# Patient Record
Sex: FEMALE | Race: OTHER | HISPANIC OR LATINO
[De-identification: names, ages, dates, MRNs, and addresses within clinical notes are randomized per-mention and may not be internally consistent; named-entity substitution may affect disease eponyms.]

---

## 2017-03-22 ENCOUNTER — RECORD ABSTRACTING (OUTPATIENT)
Age: 22
End: 2017-03-22

## 2017-03-22 DIAGNOSIS — B37.9 CANDIDIASIS, UNSPECIFIED: ICD-10-CM

## 2017-03-22 DIAGNOSIS — Z30.40 ENCOUNTER FOR SURVEILLANCE OF CONTRACEPTIVES, UNSPECIFIED: ICD-10-CM

## 2017-03-22 DIAGNOSIS — Z98.890 OTHER SPECIFIED POSTPROCEDURAL STATES: ICD-10-CM

## 2018-05-18 ENCOUNTER — EMERGENCY (EMERGENCY)
Facility: HOSPITAL | Age: 23
LOS: 0 days | Discharge: HOME | End: 2018-05-19
Attending: EMERGENCY MEDICINE | Admitting: EMERGENCY MEDICINE

## 2018-05-18 VITALS
RESPIRATION RATE: 16 BRPM | OXYGEN SATURATION: 100 % | SYSTOLIC BLOOD PRESSURE: 115 MMHG | TEMPERATURE: 97 F | DIASTOLIC BLOOD PRESSURE: 74 MMHG | HEART RATE: 67 BPM

## 2018-05-18 DIAGNOSIS — R11.0 NAUSEA: ICD-10-CM

## 2018-05-18 DIAGNOSIS — N93.9 ABNORMAL UTERINE AND VAGINAL BLEEDING, UNSPECIFIED: ICD-10-CM

## 2018-05-18 DIAGNOSIS — R06.02 SHORTNESS OF BREATH: ICD-10-CM

## 2018-05-18 DIAGNOSIS — M54.6 PAIN IN THORACIC SPINE: ICD-10-CM

## 2018-05-18 LAB
APPEARANCE UR: (no result)
BACTERIA # UR AUTO: (no result) /HPF
BILIRUB UR-MCNC: NEGATIVE — SIGNIFICANT CHANGE UP
COLOR SPEC: YELLOW — SIGNIFICANT CHANGE UP
D DIMER BLD IA.RAPID-MCNC: 75 NG/ML DDU — SIGNIFICANT CHANGE UP (ref 0–230)
DIFF PNL FLD: (no result)
EPI CELLS # UR: (no result) /HPF
GLUCOSE UR QL: NEGATIVE MG/DL — SIGNIFICANT CHANGE UP
KETONES UR-MCNC: NEGATIVE — SIGNIFICANT CHANGE UP
LEUKOCYTE ESTERASE UR-ACNC: (no result)
NITRITE UR-MCNC: NEGATIVE — SIGNIFICANT CHANGE UP
PH UR: 6.5 — SIGNIFICANT CHANGE UP (ref 5–8)
PROT UR-MCNC: NEGATIVE MG/DL — SIGNIFICANT CHANGE UP
RBC CASTS # UR COMP ASSIST: (no result) /HPF
SP GR SPEC: 1.02 — SIGNIFICANT CHANGE UP (ref 1.01–1.03)
UROBILINOGEN FLD QL: 1 MG/DL (ref 0.2–0.2)
WBC UR QL: SIGNIFICANT CHANGE UP /HPF

## 2018-05-18 NOTE — ED PROVIDER NOTE - NS ED ROS FT
Constitutional:  no fevers, no chills, no malaise  Eyes:  No visual changes  ENMT: No neck pain or stiffness, no nasal congestion, no ear pain, no throat pain  Cardiac:  No chest pain  Respiratory:  No cough, + intermittent SOB as per HPI  GI:  No nausea, vomiting, diarrhea or abdominal pain.  :  No dysuria, frequency or burning.  GYN: As per HPI  MS:  No back pain, no joint pain.  Neuro:  No headache, no dizziness, no change in mental status  Skin:  No skin rash

## 2018-05-18 NOTE — ED PROVIDER NOTE - PHYSICAL EXAMINATION
CONSTITUTIONAL: Well-developed; well-nourished; in no acute distress, nontoxic appearing  SKIN: skin exam is warm and dry,  HEAD: Normocephalic; atraumatic.  EYES: PERRL, 3 mm bilateral, no nystagmus, EOM intact; conjunctiva and sclera clear.  ENT: MMM, no nasal congestion  NECK: Supple; non tender.+ full passive ROM in all directions. No JVD  CARD: S1, S2 normal, no murmur  RESP: No wheezes, rales or rhonchi. Good air movement bilaterally, + right upper back ttp  ABD: soft; non-distended; NON TENDER, No Rebound, No guarding  EXT: Normal ROM. No cyanosis or edema. Dp Pulses intact.   NEURO: awake, alert, following commands, oriented, grossly unremarkable. No Focal deficits. GCS 15.   PSYCH: Cooperative, appropriate.

## 2018-05-18 NOTE — ED ADULT NURSE NOTE - OBJECTIVE STATEMENT
pt sts she has been having her period for about 2 weeks now and she is spotting brownish looking blood.

## 2018-05-18 NOTE — ED ADULT TRIAGE NOTE - CHIEF COMPLAINT QUOTE
"I've been having menstrual bleeding for 2 weeks now. It's not red anymore is more brown. I also have pain in my right lower back." Pt reports having implant in tricep that needs to be removed.

## 2018-05-18 NOTE — ED PROVIDER NOTE - PROGRESS NOTE DETAILS
Consulted with GYN who states that they will not come to ED for the removal of her implant. Should f/up in clinic

## 2018-05-18 NOTE — ED PROVIDER NOTE - OBJECTIVE STATEMENT
23 yr with hx of Nexplanon implantation 4 years ago presents with complaints vaginal bleeding for the past 2 weeks. Associated with upper right sided back pain. Endorses nausea w/o vomiting. Also reports intermittent SOB. No CP.    No dysuria, no vaginal d/c otherwise. Stated that she wanted the implant to be removed by [facility] on [address] however the appt would be I 1 month, causing her ED presentation.

## 2018-05-19 VITALS
OXYGEN SATURATION: 100 % | RESPIRATION RATE: 18 BRPM | HEART RATE: 69 BPM | DIASTOLIC BLOOD PRESSURE: 65 MMHG | SYSTOLIC BLOOD PRESSURE: 113 MMHG | TEMPERATURE: 97 F

## 2018-06-18 ENCOUNTER — OUTPATIENT (OUTPATIENT)
Dept: OUTPATIENT SERVICES | Facility: HOSPITAL | Age: 23
LOS: 1 days | Discharge: HOME | End: 2018-06-18

## 2018-06-18 ENCOUNTER — APPOINTMENT (OUTPATIENT)
Dept: OBGYN | Facility: CLINIC | Age: 23
End: 2018-06-18

## 2018-06-18 VITALS — DIASTOLIC BLOOD PRESSURE: 60 MMHG | WEIGHT: 123 LBS | SYSTOLIC BLOOD PRESSURE: 100 MMHG

## 2018-06-18 DIAGNOSIS — Z30.46 ENCOUNTER FOR SURVEILLANCE OF IMPLANTABLE SUBDERMAL CONTRACEPTIVE: ICD-10-CM

## 2018-06-20 LAB
C TRACH RRNA SPEC QL NAA+PROBE: NOT DETECTED
N GONORRHOEA RRNA SPEC QL NAA+PROBE: NOT DETECTED
SOURCE AMPLIFICATION: NORMAL

## 2018-07-02 DIAGNOSIS — Z01.419 ENCOUNTER FOR GYNECOLOGICAL EXAMINATION (GENERAL) (ROUTINE) WITHOUT ABNORMAL FINDINGS: ICD-10-CM

## 2018-07-03 LAB
HIV1+2 AB SPEC QL IA.RAPID: NONREACTIVE
T PALLIDUM AB SER QL IA: NEGATIVE

## 2018-07-05 LAB
HBV SURFACE AG SER QL: NONREACTIVE
HCV RNA SERPL NAA DL=5-ACNC: NOT DETECTED IU/ML
HCV RNA SERPL NAA+PROBE-LOG IU: NOT DETECTED LOGIU/ML

## 2018-08-06 ENCOUNTER — APPOINTMENT (OUTPATIENT)
Dept: OBGYN | Facility: CLINIC | Age: 23
End: 2018-08-06

## 2018-09-17 ENCOUNTER — APPOINTMENT (OUTPATIENT)
Dept: OBGYN | Facility: CLINIC | Age: 23
End: 2018-09-17

## 2018-10-01 ENCOUNTER — APPOINTMENT (OUTPATIENT)
Dept: OBGYN | Facility: CLINIC | Age: 23
End: 2018-10-01

## 2018-10-01 ENCOUNTER — OUTPATIENT (OUTPATIENT)
Dept: OUTPATIENT SERVICES | Facility: HOSPITAL | Age: 23
LOS: 1 days | Discharge: HOME | End: 2018-10-01

## 2018-10-01 ENCOUNTER — RESULT CHARGE (OUTPATIENT)
Age: 23
End: 2018-10-01

## 2018-10-01 VITALS
HEIGHT: 63 IN | SYSTOLIC BLOOD PRESSURE: 100 MMHG | BODY MASS INDEX: 20.73 KG/M2 | WEIGHT: 117 LBS | DIASTOLIC BLOOD PRESSURE: 60 MMHG

## 2018-10-01 DIAGNOSIS — O35.1XX0 MATERNAL CARE FOR (SUSPECTED) CHROMOSOMAL ABNORMALITY IN FETUS, NOT APPLICABLE OR UNSPECIFIED: ICD-10-CM

## 2018-10-01 LAB
BILIRUB UR QL STRIP: NORMAL
CLARITY UR: CLEAR
COLLECTION METHOD: NORMAL
GLUCOSE UR-MCNC: NORMAL
HCG UR QL: NORMAL EU/DL
HGB UR QL STRIP.AUTO: NORMAL
KETONES UR-MCNC: NORMAL
LEUKOCYTE ESTERASE UR QL STRIP: NORMAL
NITRITE UR QL STRIP: NORMAL
PH UR STRIP: NORMAL
PROT UR STRIP-MCNC: NORMAL
SP GR UR STRIP: NORMAL

## 2018-10-01 RX ORDER — METOCLOPRAMIDE 10 MG/1
10 TABLET ORAL
Qty: 40 | Refills: 10 | Status: ACTIVE | COMMUNITY
Start: 2018-10-01 | End: 1900-01-01

## 2018-10-02 DIAGNOSIS — Z34.01 ENCOUNTER FOR SUPERVISION OF NORMAL FIRST PREGNANCY, FIRST TRIMESTER: ICD-10-CM

## 2018-10-04 ENCOUNTER — NON-APPOINTMENT (OUTPATIENT)
Age: 23
End: 2018-10-04

## 2018-10-22 ENCOUNTER — APPOINTMENT (OUTPATIENT)
Dept: ANTEPARTUM | Facility: CLINIC | Age: 23
End: 2018-10-22

## 2018-10-24 LAB
ABO + RH PNL BLD: NORMAL
AR GENE MUT ANL BLD/T: NEGATIVE
BACTERIA UR CULT: NORMAL
BASOPHILS # BLD AUTO: 0.03 K/UL
BASOPHILS NFR BLD AUTO: 0.3 %
BLD GP AB SCN SERPL QL: NORMAL
CFTR MUT TESTED BLD/T: NEGATIVE
EOSINOPHIL # BLD AUTO: 0.1 K/UL
EOSINOPHIL NFR BLD AUTO: 1.1 %
HBV SURFACE AG SER QL: NONREACTIVE
HCT VFR BLD CALC: 33.6 %
HGB A MFR BLD: 97.3 %
HGB A2 MFR BLD: 2.7 %
HGB BLD-MCNC: 11.3 G/DL
HGB FRACT BLD-IMP: NORMAL
HIV1+2 AB SPEC QL IA.RAPID: NONREACTIVE
IMM GRANULOCYTES NFR BLD AUTO: 0.3 %
LEAD BLD-MCNC: <1 UG/DL
LYMPHOCYTES # BLD AUTO: 1.53 K/UL
LYMPHOCYTES NFR BLD AUTO: 17 %
MAN DIFF?: NORMAL
MCHC RBC-ENTMCNC: 28.5 PG
MCHC RBC-ENTMCNC: 33.6 G/DL
MCV RBC AUTO: 84.6 FL
MONOCYTES # BLD AUTO: 0.55 K/UL
MONOCYTES NFR BLD AUTO: 6.1 %
NEUTROPHILS # BLD AUTO: 6.77 K/UL
NEUTROPHILS NFR BLD AUTO: 75.2 %
PLATELET # BLD AUTO: 222 K/UL
RBC # BLD: 3.97 M/UL
RBC # FLD: 12.6 %
RUBV IGG FLD-ACNC: 2.2 INDEX
RUBV IGG SER-IMP: POSITIVE
T PALLIDUM AB SER QL IA: NEGATIVE
VZV AB TITR SER: POSITIVE
VZV IGG SER IF-ACNC: 343.9 INDEX
WBC # FLD AUTO: 9.01 K/UL

## 2018-10-29 ENCOUNTER — APPOINTMENT (OUTPATIENT)
Dept: OBGYN | Facility: CLINIC | Age: 23
End: 2018-10-29

## 2018-11-05 ENCOUNTER — OUTPATIENT (OUTPATIENT)
Dept: OUTPATIENT SERVICES | Facility: HOSPITAL | Age: 23
LOS: 1 days | Discharge: HOME | End: 2018-11-05

## 2018-11-05 ENCOUNTER — APPOINTMENT (OUTPATIENT)
Dept: OBGYN | Facility: CLINIC | Age: 23
End: 2018-11-05

## 2018-11-05 ENCOUNTER — RESULT CHARGE (OUTPATIENT)
Age: 23
End: 2018-11-05

## 2018-11-05 VITALS — SYSTOLIC BLOOD PRESSURE: 95 MMHG | WEIGHT: 114 LBS | DIASTOLIC BLOOD PRESSURE: 55 MMHG

## 2018-11-07 DIAGNOSIS — Z34.03 ENCOUNTER FOR SUPERVISION OF NORMAL FIRST PREGNANCY, THIRD TRIMESTER: ICD-10-CM

## 2018-11-07 LAB
BILIRUB UR QL STRIP: NEGATIVE
CLARITY UR: CLEAR
GLUCOSE UR-MCNC: NEGATIVE
HCG UR QL: NEGATIVE EU/DL
HGB UR QL STRIP.AUTO: NEGATIVE
KETONES UR-MCNC: 80
LEUKOCYTE ESTERASE UR QL STRIP: NEGATIVE
NITRITE UR QL STRIP: NEGATIVE
PH UR STRIP: 6
PROT UR STRIP-MCNC: NEGATIVE
SP GR UR STRIP: 1.01

## 2018-12-03 ENCOUNTER — RESULT CHARGE (OUTPATIENT)
Age: 23
End: 2018-12-03

## 2018-12-03 ENCOUNTER — APPOINTMENT (OUTPATIENT)
Dept: OBGYN | Facility: CLINIC | Age: 23
End: 2018-12-03

## 2018-12-03 ENCOUNTER — OUTPATIENT (OUTPATIENT)
Dept: OUTPATIENT SERVICES | Facility: HOSPITAL | Age: 23
LOS: 1 days | Discharge: HOME | End: 2018-12-03

## 2018-12-03 VITALS
HEIGHT: 63 IN | BODY MASS INDEX: 20.55 KG/M2 | WEIGHT: 116 LBS | DIASTOLIC BLOOD PRESSURE: 70 MMHG | SYSTOLIC BLOOD PRESSURE: 100 MMHG

## 2018-12-04 DIAGNOSIS — Z34.93 ENCOUNTER FOR SUPERVISION OF NORMAL PREGNANCY, UNSPECIFIED, THIRD TRIMESTER: ICD-10-CM

## 2018-12-04 LAB
BILIRUB UR QL STRIP: NORMAL
CLARITY UR: CLEAR
COLLECTION METHOD: NORMAL
GLUCOSE UR-MCNC: NORMAL
HCG UR QL: 0.2 EU/DL
HGB UR QL STRIP.AUTO: NORMAL
KETONES UR-MCNC: NORMAL
LEUKOCYTE ESTERASE UR QL STRIP: NORMAL
NITRITE UR QL STRIP: NORMAL
PH UR STRIP: 6
PROT UR STRIP-MCNC: NORMAL
SP GR UR STRIP: 1.02

## 2018-12-17 ENCOUNTER — OUTPATIENT (OUTPATIENT)
Dept: OUTPATIENT SERVICES | Facility: HOSPITAL | Age: 23
LOS: 1 days | Discharge: HOME | End: 2018-12-17

## 2018-12-17 ENCOUNTER — APPOINTMENT (OUTPATIENT)
Dept: ANTEPARTUM | Facility: CLINIC | Age: 23
End: 2018-12-17

## 2018-12-17 ENCOUNTER — TRANSCRIPTION ENCOUNTER (OUTPATIENT)
Age: 23
End: 2018-12-17

## 2018-12-17 RX ORDER — ETONOGESTREL 68 MG/1
IMPLANT SUBCUTANEOUS
Refills: 0 | Status: DISCONTINUED | COMMUNITY
End: 2018-12-17

## 2018-12-19 DIAGNOSIS — Z3A.20 20 WEEKS GESTATION OF PREGNANCY: ICD-10-CM

## 2018-12-19 DIAGNOSIS — O28.3 ABNORMAL ULTRASONIC FINDING ON ANTENATAL SCREENING OF MOTHER: ICD-10-CM

## 2018-12-19 DIAGNOSIS — O35.8XX1 MATERNAL CARE FOR OTHER (SUSPECTED) FETAL ABNORMALITY AND DAMAGE, FETUS 1: ICD-10-CM

## 2019-01-03 ENCOUNTER — OUTPATIENT (OUTPATIENT)
Dept: OUTPATIENT SERVICES | Facility: HOSPITAL | Age: 24
LOS: 1 days | Discharge: HOME | End: 2019-01-03

## 2019-01-03 ENCOUNTER — APPOINTMENT (OUTPATIENT)
Dept: ANTEPARTUM | Facility: CLINIC | Age: 24
End: 2019-01-03

## 2019-01-04 DIAGNOSIS — Z3A.22 22 WEEKS GESTATION OF PREGNANCY: ICD-10-CM

## 2019-01-04 DIAGNOSIS — Z03.74 ENCOUNTER FOR SUSPECTED PROBLEM WITH FETAL GROWTH RULED OUT: ICD-10-CM

## 2019-01-04 DIAGNOSIS — O28.3 ABNORMAL ULTRASONIC FINDING ON ANTENATAL SCREENING OF MOTHER: ICD-10-CM

## 2019-01-04 DIAGNOSIS — Q33.8 OTHER CONGENITAL MALFORMATIONS OF LUNG: ICD-10-CM

## 2019-01-14 ENCOUNTER — APPOINTMENT (OUTPATIENT)
Dept: OBGYN | Facility: CLINIC | Age: 24
End: 2019-01-14

## 2019-01-14 ENCOUNTER — OUTPATIENT (OUTPATIENT)
Dept: OUTPATIENT SERVICES | Facility: HOSPITAL | Age: 24
LOS: 1 days | Discharge: HOME | End: 2019-01-14

## 2019-01-14 ENCOUNTER — RESULT CHARGE (OUTPATIENT)
Age: 24
End: 2019-01-14

## 2019-01-14 VITALS — WEIGHT: 118 LBS | DIASTOLIC BLOOD PRESSURE: 54 MMHG | SYSTOLIC BLOOD PRESSURE: 96 MMHG

## 2019-01-16 DIAGNOSIS — Z34.02 ENCOUNTER FOR SUPERVISION OF NORMAL FIRST PREGNANCY, SECOND TRIMESTER: ICD-10-CM

## 2019-01-16 LAB
BILIRUB UR QL STRIP: NORMAL
CLARITY UR: CLEAR
COLLECTION METHOD: NORMAL
GLUCOSE UR-MCNC: NORMAL
HCG UR QL: 0.2 EU/DL
HGB UR QL STRIP.AUTO: NORMAL
KETONES UR-MCNC: NORMAL
LEUKOCYTE ESTERASE UR QL STRIP: NORMAL
NITRITE UR QL STRIP: NORMAL
PH UR STRIP: 6
PROT UR STRIP-MCNC: NORMAL
SP GR UR STRIP: 1.01

## 2019-01-17 ENCOUNTER — LABORATORY RESULT (OUTPATIENT)
Age: 24
End: 2019-01-17

## 2019-01-17 ENCOUNTER — APPOINTMENT (OUTPATIENT)
Dept: ANTEPARTUM | Facility: CLINIC | Age: 24
End: 2019-01-17

## 2019-01-18 DIAGNOSIS — Q33.0 CONGENITAL CYSTIC LUNG: ICD-10-CM

## 2019-01-18 DIAGNOSIS — O36.5990 MATERNAL CARE FOR OTHER KNOWN OR SUSPECTED POOR FETAL GROWTH, UNSPECIFIED TRIMESTER, NOT APPLICABLE OR UNSPECIFIED: ICD-10-CM

## 2019-01-23 LAB — GLUCOSE 1H P 50 G GLC PO SERPL-MCNC: 94 MG/DL

## 2019-01-25 ENCOUNTER — OUTPATIENT (OUTPATIENT)
Dept: OUTPATIENT SERVICES | Facility: HOSPITAL | Age: 24
LOS: 1 days | Discharge: HOME | End: 2019-01-25

## 2019-01-25 DIAGNOSIS — O35.8XX0 MATERNAL CARE FOR OTHER (SUSPECTED) FETAL ABNORMALITY AND DAMAGE, NOT APPLICABLE OR UNSPECIFIED: ICD-10-CM

## 2019-01-31 ENCOUNTER — APPOINTMENT (OUTPATIENT)
Dept: ANTEPARTUM | Facility: CLINIC | Age: 24
End: 2019-01-31

## 2019-01-31 ENCOUNTER — OUTPATIENT (OUTPATIENT)
Dept: OUTPATIENT SERVICES | Facility: HOSPITAL | Age: 24
LOS: 1 days | Discharge: HOME | End: 2019-01-31

## 2019-01-31 LAB
CYTOMEGALOVIRUS ABS IGM: <30 AU/ML
HERPES SIMPLEX 1 AND 2 ABS IGM: <0.91 RATIO
RUBV IGM FLD-ACNC: <20 AU/ML
T GONDII AB SER-IMP: NEGATIVE
T GONDII AB SER-IMP: NEGATIVE
T GONDII IGG SER QL: <3 IU/ML
T GONDII IGM SER QL: <3 AU/ML
TOXOPLASMA GONDII ABS IGM: <3 AU/ML

## 2019-02-01 ENCOUNTER — OTHER (OUTPATIENT)
Age: 24
End: 2019-02-01

## 2019-02-03 LAB
CMV IGG SERPL QL: 2.2 U/ML
CMV IGG SERPL-IMP: POSITIVE
CMV IGM SERPL QL: <8 AU/ML
CMV IGM SERPL QL: NEGATIVE
HSV 1+2 IGG SER IA-IMP: NEGATIVE
HSV 1+2 IGG SER IA-IMP: POSITIVE
HSV1 IGG SER QL: 25.4 INDEX
HSV1 IGM SER QL: NORMAL TITER
HSV2 AB FLD-ACNC: NORMAL TITER
HSV2 IGG SER QL: 0.19 INDEX

## 2019-02-09 ENCOUNTER — OUTPATIENT (OUTPATIENT)
Dept: OUTPATIENT SERVICES | Facility: HOSPITAL | Age: 24
LOS: 1 days | Discharge: HOME | End: 2019-02-09

## 2019-02-09 DIAGNOSIS — Q33.0 CONGENITAL CYSTIC LUNG: ICD-10-CM

## 2019-02-09 DIAGNOSIS — Z34.90 ENCOUNTER FOR SUPERVISION OF NORMAL PREGNANCY, UNSPECIFIED, UNSPECIFIED TRIMESTER: ICD-10-CM

## 2019-02-11 ENCOUNTER — OUTPATIENT (OUTPATIENT)
Dept: OUTPATIENT SERVICES | Facility: HOSPITAL | Age: 24
LOS: 1 days | Discharge: HOME | End: 2019-02-11

## 2019-02-11 ENCOUNTER — RESULT CHARGE (OUTPATIENT)
Age: 24
End: 2019-02-11

## 2019-02-11 ENCOUNTER — APPOINTMENT (OUTPATIENT)
Dept: OBGYN | Facility: CLINIC | Age: 24
End: 2019-02-11

## 2019-02-11 ENCOUNTER — NON-APPOINTMENT (OUTPATIENT)
Age: 24
End: 2019-02-11

## 2019-02-11 VITALS
DIASTOLIC BLOOD PRESSURE: 72 MMHG | SYSTOLIC BLOOD PRESSURE: 112 MMHG | HEIGHT: 63 IN | BODY MASS INDEX: 22.32 KG/M2 | WEIGHT: 126 LBS

## 2019-02-12 DIAGNOSIS — Z34.93 ENCOUNTER FOR SUPERVISION OF NORMAL PREGNANCY, UNSPECIFIED, THIRD TRIMESTER: ICD-10-CM

## 2019-02-14 ENCOUNTER — APPOINTMENT (OUTPATIENT)
Dept: ANTEPARTUM | Facility: CLINIC | Age: 24
End: 2019-02-14

## 2019-02-14 ENCOUNTER — OUTPATIENT (OUTPATIENT)
Dept: OUTPATIENT SERVICES | Facility: HOSPITAL | Age: 24
LOS: 1 days | Discharge: HOME | End: 2019-02-14

## 2019-02-14 LAB
BILIRUB UR QL STRIP: NORMAL
CLARITY UR: CLEAR
COLLECTION METHOD: NORMAL
GLUCOSE UR-MCNC: NORMAL
HCG UR QL: 0.2 EU/DL
HGB UR QL STRIP.AUTO: NORMAL
KETONES UR-MCNC: NORMAL
LEUKOCYTE ESTERASE UR QL STRIP: NORMAL
NITRITE UR QL STRIP: NORMAL
PH UR STRIP: 5
PROT UR STRIP-MCNC: NORMAL
SP GR UR STRIP: 1.03

## 2019-02-25 ENCOUNTER — APPOINTMENT (OUTPATIENT)
Dept: OBGYN | Facility: CLINIC | Age: 24
End: 2019-02-25

## 2019-02-28 ENCOUNTER — OUTPATIENT (OUTPATIENT)
Dept: OUTPATIENT SERVICES | Facility: HOSPITAL | Age: 24
LOS: 1 days | Discharge: HOME | End: 2019-02-28

## 2019-02-28 ENCOUNTER — APPOINTMENT (OUTPATIENT)
Dept: ANTEPARTUM | Facility: CLINIC | Age: 24
End: 2019-02-28

## 2019-02-28 LAB — CMV IGG AVIDITY SERPL IA-RTO: 1

## 2019-03-14 ENCOUNTER — APPOINTMENT (OUTPATIENT)
Dept: ANTEPARTUM | Facility: CLINIC | Age: 24
End: 2019-03-14

## 2019-03-14 ENCOUNTER — OUTPATIENT (OUTPATIENT)
Dept: OUTPATIENT SERVICES | Facility: HOSPITAL | Age: 24
LOS: 1 days | Discharge: HOME | End: 2019-03-14

## 2019-03-17 LAB
CLARI ADDITIONAL INFO: NORMAL
CLARI CHROMOSOME 13: NORMAL
CLARI CHROMOSOME 18: NORMAL
CLARI CHROMOSOME 21: NORMAL
CLARI SEX CHROMOSOMES: NORMAL
CLARITEST NIPT: NORMAL

## 2019-03-18 ENCOUNTER — APPOINTMENT (OUTPATIENT)
Dept: OBGYN | Facility: CLINIC | Age: 24
End: 2019-03-18

## 2019-03-18 ENCOUNTER — NON-APPOINTMENT (OUTPATIENT)
Age: 24
End: 2019-03-18

## 2019-03-18 ENCOUNTER — RESULT CHARGE (OUTPATIENT)
Age: 24
End: 2019-03-18

## 2019-03-18 ENCOUNTER — OUTPATIENT (OUTPATIENT)
Dept: OUTPATIENT SERVICES | Facility: HOSPITAL | Age: 24
LOS: 1 days | Discharge: HOME | End: 2019-03-18

## 2019-03-18 VITALS
BODY MASS INDEX: 23.21 KG/M2 | WEIGHT: 131 LBS | DIASTOLIC BLOOD PRESSURE: 60 MMHG | SYSTOLIC BLOOD PRESSURE: 96 MMHG | HEIGHT: 63 IN

## 2019-03-18 DIAGNOSIS — O36.5990 MATERNAL CARE FOR OTHER KNOWN OR SUSPECTED POOR FETAL GROWTH, UNSPECIFIED TRIMESTER, NOT APPLICABLE OR UNSPECIFIED: ICD-10-CM

## 2019-03-18 DIAGNOSIS — O28.3 ABNORMAL ULTRASONIC FINDING ON ANTENATAL SCREENING OF MOTHER: ICD-10-CM

## 2019-03-19 DIAGNOSIS — Z34.03 ENCOUNTER FOR SUPERVISION OF NORMAL FIRST PREGNANCY, THIRD TRIMESTER: ICD-10-CM

## 2019-03-19 LAB
BILIRUB UR QL STRIP: NORMAL
CLARITY UR: NORMAL
COLLECTION METHOD: NORMAL
GLUCOSE UR-MCNC: NORMAL
HCG UR QL: 1 EU/DL
HGB UR QL STRIP.AUTO: NORMAL
KETONES UR-MCNC: NORMAL
LEUKOCYTE ESTERASE UR QL STRIP: NORMAL
NITRITE UR QL STRIP: NORMAL
PH UR STRIP: 7
PROT UR STRIP-MCNC: NORMAL
SP GR UR STRIP: 1.01

## 2019-03-28 ENCOUNTER — APPOINTMENT (OUTPATIENT)
Dept: ANTEPARTUM | Facility: CLINIC | Age: 24
End: 2019-03-28

## 2019-03-29 ENCOUNTER — TRANSCRIPTION ENCOUNTER (OUTPATIENT)
Age: 24
End: 2019-03-29

## 2019-03-29 DIAGNOSIS — O28.3 ABNORMAL ULTRASONIC FINDING ON ANTENATAL SCREENING OF MOTHER: ICD-10-CM

## 2019-03-29 DIAGNOSIS — Q33.0 CONGENITAL CYSTIC LUNG: ICD-10-CM

## 2019-03-29 DIAGNOSIS — O36.5990 MATERNAL CARE FOR OTHER KNOWN OR SUSPECTED POOR FETAL GROWTH, UNSPECIFIED TRIMESTER, NOT APPLICABLE OR UNSPECIFIED: ICD-10-CM

## 2019-04-01 ENCOUNTER — APPOINTMENT (OUTPATIENT)
Dept: OBGYN | Facility: CLINIC | Age: 24
End: 2019-04-01

## 2019-04-01 ENCOUNTER — OUTPATIENT (OUTPATIENT)
Dept: OUTPATIENT SERVICES | Facility: HOSPITAL | Age: 24
LOS: 1 days | End: 2019-04-01
Payer: MEDICAID

## 2019-04-01 ENCOUNTER — RESULT CHARGE (OUTPATIENT)
Age: 24
End: 2019-04-01

## 2019-04-01 ENCOUNTER — OUTPATIENT (OUTPATIENT)
Dept: OUTPATIENT SERVICES | Facility: HOSPITAL | Age: 24
LOS: 1 days | Discharge: HOME | End: 2019-04-01

## 2019-04-01 VITALS
SYSTOLIC BLOOD PRESSURE: 88 MMHG | DIASTOLIC BLOOD PRESSURE: 58 MMHG | WEIGHT: 133 LBS | HEIGHT: 63 IN | BODY MASS INDEX: 23.57 KG/M2

## 2019-04-01 DIAGNOSIS — Z34.90 ENCOUNTER FOR SUPERVISION OF NORMAL PREGNANCY, UNSPECIFIED, UNSPECIFIED TRIMESTER: ICD-10-CM

## 2019-04-01 PROCEDURE — G9001: CPT

## 2019-04-02 DIAGNOSIS — Z34.03 ENCOUNTER FOR SUPERVISION OF NORMAL FIRST PREGNANCY, THIRD TRIMESTER: ICD-10-CM

## 2019-04-02 LAB
BILIRUB UR QL STRIP: NORMAL
C TRACH RRNA SPEC QL NAA+PROBE: NOT DETECTED
CLARITY UR: CLEAR
COLLECTION METHOD: NORMAL
GLUCOSE UR-MCNC: NORMAL
HCG UR QL: 0.2 EU/DL
HGB UR QL STRIP.AUTO: NORMAL
KETONES UR-MCNC: NORMAL
LEUKOCYTE ESTERASE UR QL STRIP: NORMAL
N GONORRHOEA RRNA SPEC QL NAA+PROBE: NOT DETECTED
NITRITE UR QL STRIP: NORMAL
PH UR STRIP: 7.5
PROT UR STRIP-MCNC: NORMAL
SOURCE AMPLIFICATION: NORMAL
SP GR UR STRIP: 1.02

## 2019-04-05 LAB
GP B STREP DNA SPEC QL NAA+PROBE: NORMAL
GP B STREP DNA SPEC QL NAA+PROBE: NOT DETECTED
SOURCE GBS: NORMAL

## 2019-04-08 ENCOUNTER — OUTPATIENT (OUTPATIENT)
Dept: OUTPATIENT SERVICES | Facility: HOSPITAL | Age: 24
LOS: 1 days | Discharge: HOME | End: 2019-04-08

## 2019-04-08 ENCOUNTER — APPOINTMENT (OUTPATIENT)
Dept: OBGYN | Facility: CLINIC | Age: 24
End: 2019-04-08

## 2019-04-08 ENCOUNTER — RESULT CHARGE (OUTPATIENT)
Age: 24
End: 2019-04-08

## 2019-04-08 VITALS
SYSTOLIC BLOOD PRESSURE: 100 MMHG | BODY MASS INDEX: 23.93 KG/M2 | DIASTOLIC BLOOD PRESSURE: 60 MMHG | WEIGHT: 135.06 LBS | HEIGHT: 63 IN

## 2019-04-09 DIAGNOSIS — Z34.03 ENCOUNTER FOR SUPERVISION OF NORMAL FIRST PREGNANCY, THIRD TRIMESTER: ICD-10-CM

## 2019-04-10 LAB
BILIRUB UR QL STRIP: NORMAL
CLARITY UR: CLEAR
COLLECTION METHOD: NORMAL
GLUCOSE UR-MCNC: NORMAL
HCG UR QL: 0.2 EU/DL
HGB UR QL STRIP.AUTO: NORMAL
KETONES UR-MCNC: NORMAL
LEUKOCYTE ESTERASE UR QL STRIP: NORMAL
NITRITE UR QL STRIP: NORMAL
PH UR STRIP: 6
PROT UR STRIP-MCNC: NORMAL
SP GR UR STRIP: 1.03

## 2019-04-15 ENCOUNTER — OUTPATIENT (OUTPATIENT)
Dept: OUTPATIENT SERVICES | Facility: HOSPITAL | Age: 24
LOS: 1 days | Discharge: HOME | End: 2019-04-15

## 2019-04-15 ENCOUNTER — APPOINTMENT (OUTPATIENT)
Dept: OBGYN | Facility: CLINIC | Age: 24
End: 2019-04-15

## 2019-04-15 VITALS
WEIGHT: 136 LBS | BODY MASS INDEX: 24.1 KG/M2 | DIASTOLIC BLOOD PRESSURE: 62 MMHG | HEIGHT: 63 IN | SYSTOLIC BLOOD PRESSURE: 98 MMHG

## 2019-04-18 ENCOUNTER — OUTPATIENT (OUTPATIENT)
Dept: OUTPATIENT SERVICES | Facility: HOSPITAL | Age: 24
LOS: 1 days | Discharge: HOME | End: 2019-04-18

## 2019-04-18 ENCOUNTER — APPOINTMENT (OUTPATIENT)
Dept: ANTEPARTUM | Facility: CLINIC | Age: 24
End: 2019-04-18

## 2019-04-18 ENCOUNTER — INPATIENT (INPATIENT)
Facility: HOSPITAL | Age: 24
LOS: 2 days | Discharge: HOME | End: 2019-04-21
Attending: OBSTETRICS & GYNECOLOGY | Admitting: OBSTETRICS & GYNECOLOGY

## 2019-04-18 VITALS — TEMPERATURE: 99 F

## 2019-04-18 DIAGNOSIS — O28.3 ABNORMAL ULTRASONIC FINDING ON ANTENATAL SCREENING OF MOTHER: ICD-10-CM

## 2019-04-18 DIAGNOSIS — Z34.03 ENCOUNTER FOR SUPERVISION OF NORMAL FIRST PREGNANCY, THIRD TRIMESTER: ICD-10-CM

## 2019-04-18 DIAGNOSIS — Z3A.37 37 WEEKS GESTATION OF PREGNANCY: ICD-10-CM

## 2019-04-18 DIAGNOSIS — Z98.890 OTHER SPECIFIED POSTPROCEDURAL STATES: Chronic | ICD-10-CM

## 2019-04-18 DIAGNOSIS — O36.5931 MATERNAL CARE FOR OTHER KNOWN OR SUSPECTED POOR FETAL GROWTH, THIRD TRIMESTER, FETUS 1: ICD-10-CM

## 2019-04-18 LAB
APPEARANCE UR: SIGNIFICANT CHANGE UP
BACTERIA # UR AUTO: ABNORMAL /HPF
BASOPHILS # BLD AUTO: 0.02 K/UL — SIGNIFICANT CHANGE UP (ref 0–0.2)
BASOPHILS NFR BLD AUTO: 0.2 % — SIGNIFICANT CHANGE UP (ref 0–1)
BILIRUB UR-MCNC: NEGATIVE — SIGNIFICANT CHANGE UP
BLD GP AB SCN SERPL QL: SIGNIFICANT CHANGE UP
COLOR SPEC: YELLOW — SIGNIFICANT CHANGE UP
DIFF PNL FLD: NEGATIVE — SIGNIFICANT CHANGE UP
EOSINOPHIL # BLD AUTO: 0.05 K/UL — SIGNIFICANT CHANGE UP (ref 0–0.7)
EOSINOPHIL NFR BLD AUTO: 0.5 % — SIGNIFICANT CHANGE UP (ref 0–8)
EPI CELLS # UR: ABNORMAL /HPF
GLUCOSE UR QL: NEGATIVE — SIGNIFICANT CHANGE UP
HCT VFR BLD CALC: 34.6 % — LOW (ref 37–47)
HGB BLD-MCNC: 11.7 G/DL — LOW (ref 12–16)
HIV 1 & 2 AB SERPL IA.RAPID: SIGNIFICANT CHANGE UP
IMM GRANULOCYTES NFR BLD AUTO: 0.6 % — HIGH (ref 0.1–0.3)
KETONES UR-MCNC: NEGATIVE — SIGNIFICANT CHANGE UP
LEUKOCYTE ESTERASE UR-ACNC: ABNORMAL
LYMPHOCYTES # BLD AUTO: 1.71 K/UL — SIGNIFICANT CHANGE UP (ref 1.2–3.4)
LYMPHOCYTES # BLD AUTO: 15.8 % — LOW (ref 20.5–51.1)
MCHC RBC-ENTMCNC: 28.8 PG — SIGNIFICANT CHANGE UP (ref 27–31)
MCHC RBC-ENTMCNC: 33.8 G/DL — SIGNIFICANT CHANGE UP (ref 32–37)
MCV RBC AUTO: 85.2 FL — SIGNIFICANT CHANGE UP (ref 81–99)
MONOCYTES # BLD AUTO: 0.59 K/UL — SIGNIFICANT CHANGE UP (ref 0.1–0.6)
MONOCYTES NFR BLD AUTO: 5.5 % — SIGNIFICANT CHANGE UP (ref 1.7–9.3)
NEUTROPHILS # BLD AUTO: 8.39 K/UL — HIGH (ref 1.4–6.5)
NEUTROPHILS NFR BLD AUTO: 77.4 % — HIGH (ref 42.2–75.2)
NITRITE UR-MCNC: NEGATIVE — SIGNIFICANT CHANGE UP
NRBC # BLD: 0 /100 WBCS — SIGNIFICANT CHANGE UP (ref 0–0)
PH UR: 7.5 — SIGNIFICANT CHANGE UP (ref 5–8)
PLATELET # BLD AUTO: 235 K/UL — SIGNIFICANT CHANGE UP (ref 130–400)
PRENATAL SYPHILIS TEST: SIGNIFICANT CHANGE UP
PROT UR-MCNC: NEGATIVE — SIGNIFICANT CHANGE UP
RBC # BLD: 4.06 M/UL — LOW (ref 4.2–5.4)
RBC # FLD: 13.5 % — SIGNIFICANT CHANGE UP (ref 11.5–14.5)
SP GR SPEC: 1.01 — SIGNIFICANT CHANGE UP (ref 1.01–1.03)
TYPE + AB SCN PNL BLD: SIGNIFICANT CHANGE UP
UROBILINOGEN FLD QL: 1 (ref 0.2–0.2)
WBC # BLD: 10.82 K/UL — HIGH (ref 4.8–10.8)
WBC # FLD AUTO: 10.82 K/UL — HIGH (ref 4.8–10.8)
WBC UR QL: ABNORMAL /HPF

## 2019-04-18 RX ORDER — INFLUENZA VIRUS VACCINE 15; 15; 15; 15 UG/.5ML; UG/.5ML; UG/.5ML; UG/.5ML
0.5 SUSPENSION INTRAMUSCULAR ONCE
Qty: 0 | Refills: 0 | Status: DISCONTINUED | OUTPATIENT
Start: 2019-04-18 | End: 2019-04-21

## 2019-04-18 RX ORDER — SODIUM CHLORIDE 9 MG/ML
1000 INJECTION, SOLUTION INTRAVENOUS
Qty: 0 | Refills: 0 | Status: DISCONTINUED | OUTPATIENT
Start: 2019-04-18 | End: 2019-04-19

## 2019-04-18 RX ORDER — SODIUM CHLORIDE 9 MG/ML
1000 INJECTION, SOLUTION INTRAVENOUS ONCE
Qty: 0 | Refills: 0 | Status: DISCONTINUED | OUTPATIENT
Start: 2019-04-18 | End: 2019-04-19

## 2019-04-18 RX ORDER — OXYTOCIN 10 UNIT/ML
125 VIAL (ML) INJECTION
Qty: 20 | Refills: 0 | Status: DISCONTINUED | OUTPATIENT
Start: 2019-04-18 | End: 2019-04-19

## 2019-04-18 RX ORDER — DINOPROSTONE 10 MG/241MG
10 INSERT VAGINAL ONCE
Qty: 0 | Refills: 0 | Status: DISCONTINUED | OUTPATIENT
Start: 2019-04-18 | End: 2019-04-21

## 2019-04-18 NOTE — OB PROVIDER H&P - HISTORY OF PRESENT ILLNESS
24yo  at 24yo  at 37w4d GA, by LMP c/w 1st trim sono, sent in for IOL for IUGR<10% and abnormal dopplers (elevated S/D ratio) on ultrasound today. Denies ctx, VB/LOF. Good FM. Fetal CPAM found on ultrasound, ECHO done normal. 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 CVM IgG+, IgM neg. CMV avidity 1 (high) indicating likely past infection>6mo ago. Denies other complications during this pregnancy.

## 2019-04-18 NOTE — OB PROVIDER H&P - NS_OBGYNHISTORY_OBGYN_ALL_OB_FT
OBHx: primigravid     GynHx: h/o HSV1 pos serology; denies h/o outbreaks. H/o ovarian cyst conservatively managed. Denies h/o fibroids, abnormal pap smears, other STIs.

## 2019-04-18 NOTE — OB PROVIDER H&P - ASSESSMENT
22yo  at 37w4d GA, GBS neg, fetal CPAM on ultrasound with normal fetal ECHO, CMV IgG pos, IgM neg with high avidity, for IOL with cervidil for IUGR with abnormal dopplers   - admit to L&D  - admission labs  - continuous EFM/toco  - clear liquids  - pain mgmt prn   - IV hydration  - cervidil     Dr. Estrada and Dr. Otero aware.

## 2019-04-18 NOTE — OB PROVIDER H&P - NSHPLABSRESULTS_GEN_ALL_CORE
ega 37.4w- efw 2523g (<10%); cephalic, ant placenta, MVP 4.9cm, BPP 8/8, elevated umbilical artery doppler S/D ratio 3.6  ega 34.4w- efw 2203g (30%), cephalic, ant placenta, MVP 6.9cm, BPP 8/8, right sided lung mass measuring 1.5x1.2x1.3 mm (CPAM most likely)  ega 32.4w- efw 1749g (31%), cephalic, ant placenta, MVP 6cm, lungs (CPAM 1.5x1.2x1.1mm)   ega 30.4w- SIUP  ega 28.4w- efw 1200g (47%) cephalic, ant placenta, MVP 6.4cm; CPAM+ 1.9x2.1x1.1cm  ega 26.4w- efw 947g (46%), transverse, ant placenta, MVP 5.9cm, CPAM+   ega 24.4w- efw 684g (43%), breech, ant placenta, MVP 6.2cm; echogenic bowel, low lying placenta   ega 22.4w- efw 521g, cephalic, ant placenta, MVP 4.7cm CPAM+  ega 20.1w- efw 351g, breech, 3vc, ant placenta, MVP 5.2cm; normal anatomy; CPAM+ low lying placenta  ega 12.1w- NT WNL; SIUP  FETAL ECHO- NORMAL     GCT 94     HSV1 IgG serology+  HSV1/2 IgM neg   CMV IgG+; CMV IgM neg   NIPT normal

## 2019-04-18 NOTE — OB PROVIDER H&P - NSHPPHYSICALEXAM_GEN_ALL_CORE
Vital Signs Last 24 Hrs  T(C): 37 (18 Apr 2019 21:38), Max: 37 (18 Apr 2019 21:21)  T(F): 98.6 (18 Apr 2019 21:38), Max: 98.6 (18 Apr 2019 21:21)  HR: 85 (18 Apr 2019 21:38) (85 - 85)  BP: 104/44 (18 Apr 2019 21:38) (104/44 - 104/44)  RR: 18 (18 Apr 2019 21:38) (18 - 18)    EFM: 135/mod/accels+  [place]: irreg ctx  Abd: soft, gravid, nontender, occ palpable ctx  SVE: C/L/P, vtx by sono, intact Vital Signs Last 24 Hrs  T(C): 37 (18 Apr 2019 21:38), Max: 37 (18 Apr 2019 21:21)  T(F): 98.6 (18 Apr 2019 21:38), Max: 98.6 (18 Apr 2019 21:21)  HR: 85 (18 Apr 2019 21:38) (85 - 85)  BP: 104/44 (18 Apr 2019 21:38) (104/44 - 104/44)  RR: 18 (18 Apr 2019 21:38) (18 - 18)    EFM: 135/mod/accels+  [place]: irreg ctx  Abd: soft, gravid, nontender, occ palpable ctx  SVE: C/L/P, vtx by sono, intact   Speculum: no lesions

## 2019-04-19 DIAGNOSIS — Z02.9 ENCOUNTER FOR ADMINISTRATIVE EXAMINATIONS, UNSPECIFIED: ICD-10-CM

## 2019-04-19 LAB
AMPHET UR-MCNC: NEGATIVE — SIGNIFICANT CHANGE UP
BARBITURATES UR SCN-MCNC: NEGATIVE — SIGNIFICANT CHANGE UP
BENZODIAZ UR-MCNC: NEGATIVE — SIGNIFICANT CHANGE UP
BUPRENORPHINE SCREEN, URINE RESULT: NEGATIVE — SIGNIFICANT CHANGE UP
COCAINE METAB.OTHER UR-MCNC: NEGATIVE — SIGNIFICANT CHANGE UP
L&D DRUG SCREEN, URINE: SIGNIFICANT CHANGE UP
METHADONE UR-MCNC: NEGATIVE — SIGNIFICANT CHANGE UP
OPIATES UR-MCNC: NEGATIVE — SIGNIFICANT CHANGE UP
OXYCODONE UR-MCNC: NEGATIVE — SIGNIFICANT CHANGE UP
PCP UR-MCNC: NEGATIVE — SIGNIFICANT CHANGE UP
PROPOXYPHENE QUALITATIVE URINE RESULT: NEGATIVE — SIGNIFICANT CHANGE UP

## 2019-04-19 RX ORDER — BENZOCAINE 10 %
1 GEL (GRAM) MUCOUS MEMBRANE EVERY 6 HOURS
Qty: 0 | Refills: 0 | Status: DISCONTINUED | OUTPATIENT
Start: 2019-04-19 | End: 2019-04-21

## 2019-04-19 RX ORDER — SODIUM CHLORIDE 9 MG/ML
3 INJECTION INTRAMUSCULAR; INTRAVENOUS; SUBCUTANEOUS EVERY 8 HOURS
Qty: 0 | Refills: 0 | Status: DISCONTINUED | OUTPATIENT
Start: 2019-04-19 | End: 2019-04-21

## 2019-04-19 RX ORDER — DIPHENHYDRAMINE HCL 50 MG
50 CAPSULE ORAL ONCE
Qty: 0 | Refills: 0 | Status: COMPLETED | OUTPATIENT
Start: 2019-04-19 | End: 2019-04-19

## 2019-04-19 RX ORDER — DOCUSATE SODIUM 100 MG
100 CAPSULE ORAL
Qty: 0 | Refills: 0 | Status: DISCONTINUED | OUTPATIENT
Start: 2019-04-19 | End: 2019-04-21

## 2019-04-19 RX ORDER — LANOLIN
1 OINTMENT (GRAM) TOPICAL EVERY 6 HOURS
Qty: 0 | Refills: 0 | Status: DISCONTINUED | OUTPATIENT
Start: 2019-04-19 | End: 2019-04-21

## 2019-04-19 RX ORDER — DIPHENHYDRAMINE HCL 50 MG
25 CAPSULE ORAL EVERY 6 HOURS
Qty: 0 | Refills: 0 | Status: DISCONTINUED | OUTPATIENT
Start: 2019-04-19 | End: 2019-04-21

## 2019-04-19 RX ORDER — ACETAMINOPHEN 500 MG
650 TABLET ORAL EVERY 6 HOURS
Qty: 0 | Refills: 0 | Status: DISCONTINUED | OUTPATIENT
Start: 2019-04-19 | End: 2019-04-21

## 2019-04-19 RX ORDER — ONDANSETRON 8 MG/1
4 TABLET, FILM COATED ORAL EVERY 6 HOURS
Qty: 0 | Refills: 0 | Status: DISCONTINUED | OUTPATIENT
Start: 2019-04-19 | End: 2019-04-21

## 2019-04-19 RX ORDER — GLYCERIN ADULT
1 SUPPOSITORY, RECTAL RECTAL AT BEDTIME
Qty: 0 | Refills: 0 | Status: DISCONTINUED | OUTPATIENT
Start: 2019-04-19 | End: 2019-04-21

## 2019-04-19 RX ORDER — IBUPROFEN 200 MG
600 TABLET ORAL EVERY 6 HOURS
Qty: 0 | Refills: 0 | Status: DISCONTINUED | OUTPATIENT
Start: 2019-04-19 | End: 2019-04-21

## 2019-04-19 RX ORDER — DIBUCAINE 1 %
1 OINTMENT (GRAM) RECTAL EVERY 4 HOURS
Qty: 0 | Refills: 0 | Status: DISCONTINUED | OUTPATIENT
Start: 2019-04-19 | End: 2019-04-21

## 2019-04-19 RX ORDER — MAGNESIUM HYDROXIDE 400 MG/1
30 TABLET, CHEWABLE ORAL
Qty: 0 | Refills: 0 | Status: DISCONTINUED | OUTPATIENT
Start: 2019-04-19 | End: 2019-04-21

## 2019-04-19 RX ORDER — AER TRAVELER 0.5 G/1
1 SOLUTION RECTAL; TOPICAL EVERY 4 HOURS
Qty: 0 | Refills: 0 | Status: DISCONTINUED | OUTPATIENT
Start: 2019-04-19 | End: 2019-04-21

## 2019-04-19 RX ORDER — OXYCODONE AND ACETAMINOPHEN 5; 325 MG/1; MG/1
1 TABLET ORAL
Qty: 0 | Refills: 0 | Status: DISCONTINUED | OUTPATIENT
Start: 2019-04-19 | End: 2019-04-21

## 2019-04-19 RX ORDER — SIMETHICONE 80 MG/1
80 TABLET, CHEWABLE ORAL EVERY 6 HOURS
Qty: 0 | Refills: 0 | Status: DISCONTINUED | OUTPATIENT
Start: 2019-04-19 | End: 2019-04-21

## 2019-04-19 RX ORDER — BUTORPHANOL TARTRATE 2 MG/ML
2 INJECTION, SOLUTION INTRAMUSCULAR; INTRAVENOUS ONCE
Qty: 0 | Refills: 0 | Status: DISCONTINUED | OUTPATIENT
Start: 2019-04-19 | End: 2019-04-19

## 2019-04-19 RX ORDER — OXYTOCIN 10 UNIT/ML
2 VIAL (ML) INJECTION
Qty: 30 | Refills: 0 | Status: DISCONTINUED | OUTPATIENT
Start: 2019-04-19 | End: 2019-04-21

## 2019-04-19 RX ORDER — OXYTOCIN 10 UNIT/ML
41.67 VIAL (ML) INJECTION
Qty: 20 | Refills: 0 | Status: DISCONTINUED | OUTPATIENT
Start: 2019-04-19 | End: 2019-04-21

## 2019-04-19 RX ORDER — NALOXONE HYDROCHLORIDE 4 MG/.1ML
0.1 SPRAY NASAL
Qty: 0 | Refills: 0 | Status: DISCONTINUED | OUTPATIENT
Start: 2019-04-19 | End: 2019-04-21

## 2019-04-19 RX ADMIN — BUTORPHANOL TARTRATE 2 MILLIGRAM(S): 2 INJECTION, SOLUTION INTRAMUSCULAR; INTRAVENOUS at 03:28

## 2019-04-19 RX ADMIN — Medication 2 MILLIUNIT(S)/MIN: at 14:18

## 2019-04-19 RX ADMIN — Medication 50 MILLIGRAM(S): at 03:30

## 2019-04-19 NOTE — PROGRESS NOTE ADULT - ASSESSMENT
22yo  at 37w5d GA, GBS neg, fetal CPAM on ultrasound with normal fetal ECHO, CMV IgG pos, IgM neg with high avidity, for IOL for IUGR and abnormal dopplers S/P cervidil   - rest, will place cervical ripening balloon in 30min-1 hour  - continuous EFM/toco  - clear liquids  - pain mgmt prn   - IV hydration  - f/u UDS     Dr. Otero and Dr. Estrada aware.

## 2019-04-19 NOTE — OB PROVIDER DELIVERY SUMMARY - NSPROVIDERDELIVERYNOTE_OBGYN_ALL_OB_FT
Patient was fully dilated and pushing. Head delivered OA, restituted to BERNDAINE, Anterior shoulder delivered, followed by the posterior shoulder, rest of the body without complications. Baby suctioned, and placed on mother's abdomen. cord clamped and cut. Cord segment and blood obtained. Placenta delivered, intact. Fundus massaged and firm, with good hemostasis. Pitocin given postpartum. Vaginal vault, perineum, and cervix inspected, and first degree perineal laceration noted and repaired with 2-0 chromic in usual fashion. Live infant delivered. APGARs 9/9.     Dr. Otero and Dr. bob present

## 2019-04-19 NOTE — PROGRESS NOTE ADULT - SUBJECTIVE AND OBJECTIVE BOX
PGY4 Note    Patient seen at bedside. No complaints at the moment.    T(F): 98.96 ( @ 16:32), Max: 98.96 ( @ 16:32)  HR: 77 ( @ 17:18)  BP: 107/60 ( @ 17:18) (88/51 - 120/79)  RR: 18 ( @ 21:38)  EFM: 140/mod/early+variable deccels  TOCO: q2-3min  SVE: deferred  last exam at 1512 5/80/-2   AROM at 1230    Medications:  butorphanol Injectable: 2 ( @ 03:28)  diphenhydrAMINE   Injectable: 50 ( @ 03:30)  oxytocin Infusion: 2 ( @ 13:58)      Labs:                        11.7   10.82 )-----------( 235      ( 2019 21:40 )             34.6           Prenatal Syphilis Test: Nonreact ( @ 21:40)  Rapid HIV-1/2 Antibody: Nonreact ( @ 21:40)  Type + Screen: O POS (19 @ 21:41)  Antibody Screen: NEG (19 @ 21:41)    Urinalysis Basic - ( 2019 21:40 )    Color: Yellow / Appearance: SL CLOUDY / S.010 / pH: x  Gluc: x / Ketone: Negative  / Bili: Negative / Urobili: 1.0   Blood: x / Protein: Negative / Nitrite: Negative   Leuk Esterase: Moderate / RBC: x / WBC 26-50 /HPF   Sq Epi: x / Non Sq Epi: Few /HPF / Bacteria: Few /HPF        A/P:   23y  at 37w5d GA IUGR with abnormal dopplers, IOL s/p cervidil and balloon, now on pitocin  -Continue current management   -Pain management prn  -Continuous EFM/toco  -Reevaluate     Dr Donavan ang[place].

## 2019-04-19 NOTE — CHART NOTE - NSCHARTNOTEFT_GEN_A_CORE
Friday 04/19/2019  01:33 AM    Patient being admitted for Cervidil Induction for IUGR.  Patient's chart, notes and labs reviewed.                        11.7   10.82 )-----------( 235      ( 18 Apr 2019 21:40 )             34.6   Admission lab results acceptable for consideration of Neuraxial Nerve Block for Labor Analgesia/Anesthesia.  Will remain immediately available. 2019  01:33 AM    Patient being admitted for Cervidil Induction for IUGR.  37 +4,     Patient's chart, notes and labs reviewed.                        11.7   10.82 )-----------( 235      ( 2019 21:40 )             34.6   Admission lab results acceptable for consideration of Neuraxial Nerve Block for Labor Analgesia/Anesthesia.  Will remain immediately available.

## 2019-04-19 NOTE — PROGRESS NOTE ADULT - SUBJECTIVE AND OBJECTIVE BOX
OBGYN PGY2 Note:     Patient seen and examined at bedside. Comfortable, denies complaints. Cervidil removed at 0101 in view of recurrent late decels; despite positional change; IV fluid bolus and oxygen by face mask. SVE 1/50/-3      T(C): 37 (19 @ 21:38), Max: 37 (19 @ 21:21)  HR: 90 (19 @ 01:05) (85 - 90)  BP: 120/79 (19 @ 01:05) (104/44 - 120/79)  RR: 18 (19 @ 21:38) (18 - )    EFM: 125/mod/accels+   [place]: q1-3min  SVE: 1/50/-3    Meds: dinoprostone Insert 10 milliGRAM(s) Vaginal once  influenza   Vaccine 0.5 milliLiter(s) IntraMuscular once  lactated ringers Bolus 1000 milliLiter(s) IV Bolus once  lactated ringers. 1000 milliLiter(s) IV Continuous <Continuous>  oxytocin Infusion 125 milliUNIT(s)/Min IV Continuous <Continuous>  S/P cervidil       Labs:  O pos                       11.7   10.82 )-----------( 235      ( 2019 21:40 )             34.6         Urinalysis Basic - ( 2019 21:40 )    Color: Yellow / Appearance: SL CLOUDY / S.010 / pH: x  Gluc: x / Ketone: Negative  / Bili: Negative / Urobili: 1.0   Blood: x / Protein: Negative / Nitrite: Negative   Leuk Esterase: Moderate / RBC: x / WBC 26-50 /HPF   Sq Epi: x / Non Sq Epi: Few /HPF / Bacteria: Few /HPF         Prenatal Syphilis Test: Nonreact (19 @ 21:40)  Rapid HIV-1/2 Antibody: Nonreact (19 @ 21:40)

## 2019-04-19 NOTE — PROCEDURE NOTE - NSLOADDOSE_OBGYN_ALL_OB
no Continuous Bupivicaine 0.1 percent/Fentanyl 400mcg added to infusion; 2mc/ml; Fentanyl 400mcg added to 200ml Bupivacaine 0.1% for JANICE infusion (2mcg/ml)

## 2019-04-20 LAB
BASOPHILS # BLD AUTO: 0.02 K/UL — SIGNIFICANT CHANGE UP (ref 0–0.2)
BASOPHILS NFR BLD AUTO: 0.1 % — SIGNIFICANT CHANGE UP (ref 0–1)
EOSINOPHIL # BLD AUTO: 0.04 K/UL — SIGNIFICANT CHANGE UP (ref 0–0.7)
EOSINOPHIL NFR BLD AUTO: 0.3 % — SIGNIFICANT CHANGE UP (ref 0–8)
HCT VFR BLD CALC: 33.1 % — LOW (ref 37–47)
HGB BLD-MCNC: 10.9 G/DL — LOW (ref 12–16)
IMM GRANULOCYTES NFR BLD AUTO: 0.4 % — HIGH (ref 0.1–0.3)
LYMPHOCYTES # BLD AUTO: 1.39 K/UL — SIGNIFICANT CHANGE UP (ref 1.2–3.4)
LYMPHOCYTES # BLD AUTO: 10.2 % — LOW (ref 20.5–51.1)
MCHC RBC-ENTMCNC: 28.6 PG — SIGNIFICANT CHANGE UP (ref 27–31)
MCHC RBC-ENTMCNC: 32.9 G/DL — SIGNIFICANT CHANGE UP (ref 32–37)
MCV RBC AUTO: 86.9 FL — SIGNIFICANT CHANGE UP (ref 81–99)
MONOCYTES # BLD AUTO: 0.84 K/UL — HIGH (ref 0.1–0.6)
MONOCYTES NFR BLD AUTO: 6.2 % — SIGNIFICANT CHANGE UP (ref 1.7–9.3)
NEUTROPHILS # BLD AUTO: 11.3 K/UL — HIGH (ref 1.4–6.5)
NEUTROPHILS NFR BLD AUTO: 82.8 % — HIGH (ref 42.2–75.2)
NRBC # BLD: 0 /100 WBCS — SIGNIFICANT CHANGE UP (ref 0–0)
PLATELET # BLD AUTO: 202 K/UL — SIGNIFICANT CHANGE UP (ref 130–400)
RBC # BLD: 3.81 M/UL — LOW (ref 4.2–5.4)
RBC # FLD: 13.9 % — SIGNIFICANT CHANGE UP (ref 11.5–14.5)
WBC # BLD: 13.65 K/UL — HIGH (ref 4.8–10.8)
WBC # FLD AUTO: 13.65 K/UL — HIGH (ref 4.8–10.8)

## 2019-04-20 RX ADMIN — SODIUM CHLORIDE 3 MILLILITER(S): 9 INJECTION INTRAMUSCULAR; INTRAVENOUS; SUBCUTANEOUS at 12:44

## 2019-04-20 RX ADMIN — Medication 1 TABLET(S): at 12:41

## 2019-04-20 NOTE — PROGRESS NOTE ADULT - ASSESSMENT
22 yo now , s/p , PPD1, doing well.    - pain management PRN  - c/w regular diet, PO hydration  - monitor vitals, bleeding  - encourage ambulation  - anticipate d/c tomorrow    Dr. Ahumada aware and Dr. Truong to be made aware. 22 yo now , s/p , PPD1, doing well.    - pain management PRN  - c/w regular diet, PO hydration  - monitor vitals, bleeding  - encourage ambulation  - f/up 1100 CBC  - anticipate d/c tomorrow    Dr. Ahumada aware and Dr. Truong to be made aware.

## 2019-04-20 NOTE — PROGRESS NOTE ADULT - SUBJECTIVE AND OBJECTIVE BOX
PGY 1 Post Partum note    Subjective: Pt seen and examined at bedside. Doing well, pain controlled. Pt is voiding without difficulty, passing flatus no BM, tolerating regular diet, ambulating, breast feeding. Denies CP, SOB, N/V, LE pain.    Physical exam:    Vital Signs Last 24 Hrs  T(F): 98.5 (20 Apr 2019 07:15), Max: 99 (19 Apr 2019 21:12)  HR: 96 (20 Apr 2019 07:15) (63 - 126)  BP: 98/61 (20 Apr 2019 07:15) (88/51 - 115/68)  RR: 18 (20 Apr 2019 07:15) (18 - 20)    Gen: NAD  CVS: s1s2, rrr  Lungs: ctab, no r/r/w  Abdomen: Soft, appropriately tender, no distension , firm uterine fundus below umbilicus, +BS  Pelvic: Normal lochia rubra noted  Ext: No calf tenderness    Diet: Regular  meds:         LABS:                        11.7   10.82 )-----------( 235      ( 18 Apr 2019 21:40 )             34.6

## 2019-04-21 ENCOUNTER — TRANSCRIPTION ENCOUNTER (OUTPATIENT)
Age: 24
End: 2019-04-21

## 2019-04-21 VITALS
TEMPERATURE: 97 F | DIASTOLIC BLOOD PRESSURE: 55 MMHG | RESPIRATION RATE: 19 BRPM | HEART RATE: 79 BPM | SYSTOLIC BLOOD PRESSURE: 93 MMHG

## 2019-04-21 RX ORDER — SIMETHICONE 80 MG/1
1 TABLET, CHEWABLE ORAL
Qty: 0 | Refills: 0 | COMMUNITY
Start: 2019-04-21

## 2019-04-21 RX ORDER — IBUPROFEN 200 MG
1 TABLET ORAL
Qty: 0 | Refills: 0 | COMMUNITY
Start: 2019-04-21

## 2019-04-21 RX ORDER — DOCUSATE SODIUM 100 MG
1 CAPSULE ORAL
Qty: 0 | Refills: 0 | COMMUNITY
Start: 2019-04-21

## 2019-04-21 RX ADMIN — Medication 1 TABLET(S): at 11:15

## 2019-04-21 RX ADMIN — Medication 600 MILLIGRAM(S): at 00:18

## 2019-04-21 RX ADMIN — Medication 600 MILLIGRAM(S): at 00:45

## 2019-04-21 NOTE — PROGRESS NOTE ADULT - ASSESSMENT
24yo P1, S/P  PPD#2 , recovering well   - RH positive, Rubella immune   - encourage ambulation  - PO hydration  - Continue diet as tolerated   - Monitor vitals and bleeding    Dr. Cooley aware, Dr. Go aware

## 2019-04-21 NOTE — PROGRESS NOTE ADULT - SUBJECTIVE AND OBJECTIVE BOX
HEBER SOFIA  23y  Female    PGY1 Note:  Patient seen and examined bedside. No overnight events. Denies heavy vaginal bleeding and reports pain well controlled. Ambulating without difficulty. Tolerating Diet. Reports +Flatus, +BM.       MEDICATIONS  (STANDING):        MEDICATIONS  (PRN):  acetaminophen   Tablet .. 650 milliGRAM(s) Oral every 6 hours PRN Temp greater or equal to 38.5C (101.3F), Mild Pain (1 - 3)  benzocaine 20%/menthol 0.5% Spray 1 Spray(s) Topical every 6 hours PRN Perineal discomfort  dibucaine 1% Ointment 1 Application(s) Topical every 4 hours PRN Perineal Discomfort  diphenhydrAMINE 25 milliGRAM(s) Oral every 6 hours PRN Itching  docusate sodium 100 milliGRAM(s) Oral two times a day PRN Stool Softening  glycerin Suppository - Adult 1 Suppository(s) Rectal at bedtime PRN Constipation  ibuprofen  Tablet. 600 milliGRAM(s) Oral every 6 hours PRN Moderate Pain (4 - 6)  lanolin Ointment 1 Application(s) Topical every 6 hours PRN Sore Nipples  magnesium hydroxide Suspension 30 milliLiter(s) Oral two times a day PRN Constipation  naloxone Injectable 0.1 milliGRAM(s) IV Push every 3 minutes PRN For ANY of the following changes in patient status:  A. RR LESS THAN 10 breaths per minute, B. Oxygen saturation LESS THAN 90%, C. Sedation score of 6  ondansetron Injectable 4 milliGRAM(s) IV Push every 6 hours PRN Nausea  oxyCODONE    5 mG/acetaminophen 325 mG 1 Tablet(s) Oral every 3 hours PRN Moderate Pain (4 - 6)  simethicone 80 milliGRAM(s) Chew every 6 hours PRN Gas  witch hazel Pads 1 Application(s) Topical every 4 hours PRN Perineal Discomfort      PAST MEDICAL & SURGICAL HISTORY:  No pertinent past medical history  Status post right breast lumpectomy      Physical Exam  Vital Signs Last 24 Hrs  T(C): 35.9 (21 Apr 2019 09:03), Max: 36.7 (20 Apr 2019 17:30)  T(F): 96.7 (21 Apr 2019 09:03), Max: 98 (20 Apr 2019 17:30)  HR: 79 (21 Apr 2019 09:03) (74 - 80)  BP: 93/55 (21 Apr 2019 09:03) (93/55 - 106/67)  BP(mean): 80 (20 Apr 2019 17:30) (80 - 80)  RR: 19 (21 Apr 2019 09:03) (18 - 19)    Gen: AAOx3, NAD  Ext: No calf tenderness, no swelling  Fundus: firm, below umbilicus. Nontender.   Abd: Soft, nontender, nondistended, BS+  Lochia: Minimal, rubria.       Labs:                        10.9   13.65 )-----------( 202      ( 20 Apr 2019 12:00 )             33.1                         11.7   10.82 )-----------( 235      ( 18 Apr 2019 21:40 )             34.6

## 2019-04-21 NOTE — DISCHARGE NOTE OB - CARE PROVIDER_API CALL
Faith Munguia)  Obstetrics and Gynecology  [address] [place] [id number]  Phone: [phone]  Fax: [phone]  Follow Up Time:

## 2019-04-21 NOTE — DISCHARGE NOTE OB - PATIENT PORTAL LINK FT
You can access the [facility] Patient Portal, offered by [facility], by registering with the following website: [email or web address]

## 2019-04-21 NOTE — DISCHARGE NOTE OB - HOSPITAL COURSE
DATE OF DISCHARGE: 19 @ 09:55    HISTORY OF PRESENT ILLNESS/HOSPITAL COURSE: HPI:  24yo  at 37w4d GA, by LMP c/w 1st trim sono, sent in for IOL for IUGR<10% and abnormal dopplers (elevated S/D ratio) on ultrasound today. Denies ctx, VB/LOF. Good FM. Fetal CPAM found on ultrasound, ECHO done normal. 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 CVM IgG+, IgM neg. CMV avidity 1 (high) indicating likely past infection>6mo ago. Denies other complications during this pregnancy. (2019 22:01)    PAST MEDICAL & SURGICAL HISTORY:  No pertinent past medical history  Status post right breast lumpectomy      PROCEDURES PERFORMED: Term spontaneous vaginal delivery  COMPLICATIONS:  -----   POST PARTUM COURSE: uncomplicated, discharged home on PPD2  FINAL DIAGNOSIS:  Status post normal spontaneous vaginal delivery at Gestational Age    DISCHARGE CBC:                       10.9   13.65 )-----------( 202      ( 2019 12:00 )             33.1     DISCHARGE INSTRUCTIONS:  If you have severe abdominal pain, heavy vaginal bleeding, shortness of breath or chest pain please call your doctor or come to the emergency room.   DIET:  Advance as tolerated.  ACTIVITY:  Advance as tolerated.  Pelvic rest for 6 weeks.  Nothing to be inserted into the vagina for 6 weeks, i.e. no tampons, douching, sexual relations, or tub baths.   FOLLOW UP: Make an appointment to see your doctor as instructed   PRESCRIPTIONS: Prescriptions:

## 2019-04-21 NOTE — DISCHARGE NOTE NURSING/CASE MANAGEMENT/SOCIAL WORK - NSDCDPATPORTLINK_GEN_ALL_CORE
You can access the [facility] Patient Portal, offered by [facility], by registering with the following website: [email or web address]

## 2019-04-21 NOTE — DISCHARGE NOTE OB - MEDICATION SUMMARY - MEDICATIONS TO TAKE
I will START or STAY ON the medications listed below when I get home from the hospital:    ibuprofen 600 mg oral tablet  -- 1 tab(s) by mouth every 6 hours, As needed, Moderate Pain (4 - 6)  -- Indication: For pain    docusate sodium 100 mg oral capsule  -- 1 cap(s) by mouth 2 times a day, As needed, Stool Softening  -- Indication: For constipation    simethicone 80 mg oral tablet, chewable  -- 1 tab(s) by mouth every 6 hours, As needed, Gas  -- Indication: For gas pain

## 2019-04-22 ENCOUNTER — APPOINTMENT (OUTPATIENT)
Dept: OBGYN | Facility: CLINIC | Age: 24
End: 2019-04-22

## 2019-04-22 DIAGNOSIS — Z71.89 OTHER SPECIFIED COUNSELING: ICD-10-CM

## 2019-04-25 DIAGNOSIS — O36.5930 MATERNAL CARE FOR OTHER KNOWN OR SUSPECTED POOR FETAL GROWTH, THIRD TRIMESTER, NOT APPLICABLE OR UNSPECIFIED: ICD-10-CM

## 2019-04-25 DIAGNOSIS — Z22.8 CARRIER OF OTHER INFECTIOUS DISEASES: ICD-10-CM

## 2019-04-25 DIAGNOSIS — Z28.21 IMMUNIZATION NOT CARRIED OUT BECAUSE OF PATIENT REFUSAL: ICD-10-CM

## 2019-04-25 DIAGNOSIS — O35.3XX0 MATERNAL CARE FOR (SUSPECTED) DAMAGE TO FETUS FROM VIRAL DISEASE IN MOTHER, NOT APPLICABLE OR UNSPECIFIED: ICD-10-CM

## 2019-04-25 DIAGNOSIS — Z3A.37 37 WEEKS GESTATION OF PREGNANCY: ICD-10-CM

## 2019-04-29 ENCOUNTER — APPOINTMENT (OUTPATIENT)
Dept: OBGYN | Facility: CLINIC | Age: 24
End: 2019-04-29

## 2019-06-10 ENCOUNTER — RESULT CHARGE (OUTPATIENT)
Age: 24
End: 2019-06-10

## 2019-06-10 ENCOUNTER — OUTPATIENT (OUTPATIENT)
Dept: OUTPATIENT SERVICES | Facility: HOSPITAL | Age: 24
LOS: 1 days | Discharge: HOME | End: 2019-06-10

## 2019-06-10 ENCOUNTER — APPOINTMENT (OUTPATIENT)
Dept: OBGYN | Facility: CLINIC | Age: 24
End: 2019-06-10
Payer: MEDICAID

## 2019-06-10 VITALS
WEIGHT: 122 LBS | HEIGHT: 63 IN | BODY MASS INDEX: 21.62 KG/M2 | SYSTOLIC BLOOD PRESSURE: 110 MMHG | DIASTOLIC BLOOD PRESSURE: 72 MMHG

## 2019-06-10 DIAGNOSIS — Z30.46 ENCOUNTER FOR SURVEILLANCE OF IMPLANTABLE SUBDERMAL CONTRACEPTIVE: ICD-10-CM

## 2019-06-10 DIAGNOSIS — O35.8XX0 MATERNAL CARE FOR OTHER (SUSPECTED) FETAL ABNORMALITY AND DAMAGE, NOT APPLICABLE OR UNSPECIFIED: ICD-10-CM

## 2019-06-10 DIAGNOSIS — Q33.0 CONGENITAL CYSTIC LUNG: ICD-10-CM

## 2019-06-10 DIAGNOSIS — Z98.890 OTHER SPECIFIED POSTPROCEDURAL STATES: Chronic | ICD-10-CM

## 2019-06-10 NOTE — HISTORY OF PRESENT ILLNESS
[Postpartum Follow Up] : postpartum follow up [Complications:___] : no complications [] : delivered by vaginal delivery [Breastfeeding] : not currently nursing [Back to Normal] : is back to normal in size [Doing Well] : is doing well [Normal] : the vagina was normal [None] : no vaginal bleeding [de-identified] : no depression, wantrs patch-has estrogen increases risk of dvt/pe/mi./stroke [de-identified] : xulane, f/drea 6 months [No Sign of Infection] : is showing no signs of infection

## 2019-06-12 LAB
HCG UR QL: NEGATIVE
QUALITY CONTROL: YES

## 2019-08-15 ENCOUNTER — MESSAGE (OUTPATIENT)
Age: 24
End: 2019-08-15

## 2019-09-16 ENCOUNTER — APPOINTMENT (OUTPATIENT)
Dept: OBGYN | Facility: CLINIC | Age: 24
End: 2019-09-16
Payer: MEDICAID

## 2019-09-16 ENCOUNTER — OUTPATIENT (OUTPATIENT)
Dept: OUTPATIENT SERVICES | Facility: HOSPITAL | Age: 24
LOS: 1 days | Discharge: HOME | End: 2019-09-16

## 2019-09-16 DIAGNOSIS — Z98.890 OTHER SPECIFIED POSTPROCEDURAL STATES: Chronic | ICD-10-CM

## 2019-09-16 PROCEDURE — 99212 OFFICE O/P EST SF 10 MIN: CPT

## 2019-09-16 NOTE — PHYSICAL EXAM
[Normal] : uterus [No Bleeding] : there was no active vaginal bleeding [Uterine Adnexae] : were not tender and not enlarged [Nl Sphincter Tone] : normal sphincter tone [Internal Hemorrhoid] : an internal hemorrhoid [External Hemorrhoid] : no external hemorrhoids

## 2019-09-18 DIAGNOSIS — K64.8 OTHER HEMORRHOIDS: ICD-10-CM

## 2020-11-16 ENCOUNTER — APPOINTMENT (OUTPATIENT)
Dept: OBGYN | Facility: CLINIC | Age: 25
End: 2020-11-16

## 2020-12-02 ENCOUNTER — OUTPATIENT (OUTPATIENT)
Dept: OUTPATIENT SERVICES | Facility: HOSPITAL | Age: 25
LOS: 1 days | Discharge: HOME | End: 2020-12-02

## 2020-12-02 ENCOUNTER — APPOINTMENT (OUTPATIENT)
Dept: OBGYN | Facility: CLINIC | Age: 25
End: 2020-12-02
Payer: MEDICAID

## 2020-12-02 VITALS — SYSTOLIC BLOOD PRESSURE: 90 MMHG | DIASTOLIC BLOOD PRESSURE: 60 MMHG | WEIGHT: 124 LBS

## 2020-12-02 DIAGNOSIS — Z98.890 OTHER SPECIFIED POSTPROCEDURAL STATES: Chronic | ICD-10-CM

## 2020-12-02 DIAGNOSIS — K64.8 OTHER HEMORRHOIDS: ICD-10-CM

## 2020-12-02 PROCEDURE — 99395 PREV VISIT EST AGE 18-39: CPT

## 2020-12-02 PROCEDURE — 99212 OFFICE O/P EST SF 10 MIN: CPT | Mod: 25

## 2020-12-02 RX ORDER — NORETHINDRONE ACETATE AND ETHINYL ESTRADIOL AND FERROUS FUMARATE 1MG-20(21)
1-20 KIT ORAL DAILY
Qty: 28 | Refills: 12 | Status: ACTIVE | COMMUNITY
Start: 2020-12-02 | End: 1900-01-01

## 2020-12-02 RX ORDER — METRONIDAZOLE 500 MG/1
500 TABLET ORAL TWICE DAILY
Qty: 14 | Refills: 1 | Status: ACTIVE | COMMUNITY
Start: 2020-12-02 | End: 1900-01-01

## 2020-12-02 NOTE — DISCUSSION/SUMMARY
[FreeTextEntry1] : #1 health maintenance done\par \par #2 bv\par hx taken\par exam done\par aptima done\par metro sent\par

## 2020-12-02 NOTE — PHYSICAL EXAM
[Appropriately responsive] : appropriately responsive [Alert] : alert [No Acute Distress] : no acute distress [No Lymphadenopathy] : no lymphadenopathy [Regular Rate Rhythm] : regular rate rhythm [No Murmurs] : no murmurs [Clear to Auscultation B/L] : clear to auscultation bilaterally [Soft] : soft [Non-tender] : non-tender [Non-distended] : non-distended [No HSM] : No HSM [No Lesions] : no lesions [No Mass] : no mass [Oriented x3] : oriented x3 [Examination Of The Breasts] : a normal appearance [No Masses] : no breast masses were palpable [Labia Majora] : normal [Labia Minora] : normal [Discharge] : a  ~M vaginal discharge was present [Scant] : scant [White] : white [Thin] : thin [Normal] : normal [Uterine Adnexae] : normal

## 2020-12-02 NOTE — HISTORY OF PRESENT ILLNESS
[FreeTextEntry1] : 26 y/o female for annual. Unhappy with patch-having skin reactions. Discussed options-will try ocps\par pt also c/o discharge, 2 weeks, scant odor, no itch. [Patient reported PAP Smear was normal] : Patient reported PAP Smear was normal [PapSmeardate] : 2018

## 2020-12-08 LAB
A VAGINAE DNA VAG QL NAA+PROBE: NORMAL
BVAB2 DNA VAG QL NAA+PROBE: NORMAL
C KRUSEI DNA VAG QL NAA+PROBE: NEGATIVE
C TRACH RRNA SPEC QL NAA+PROBE: NEGATIVE
MEGA1 DNA VAG QL NAA+PROBE: ABNORMAL
N GONORRHOEA RRNA SPEC QL NAA+PROBE: NEGATIVE
T VAGINALIS RRNA SPEC QL NAA+PROBE: NEGATIVE

## 2021-01-20 ENCOUNTER — APPOINTMENT (OUTPATIENT)
Dept: OBGYN | Facility: CLINIC | Age: 26
End: 2021-01-20

## 2021-07-23 RX ORDER — NORETHINDRONE ACETATE AND ETHINYL ESTRADIOL AND FERROUS FUMARATE 1MG-20(21)
1-20 KIT ORAL DAILY
Qty: 28 | Refills: 1 | Status: ACTIVE | COMMUNITY
Start: 2021-07-23 | End: 1900-01-01

## 2021-09-22 ENCOUNTER — APPOINTMENT (OUTPATIENT)
Dept: OBGYN | Facility: CLINIC | Age: 26
End: 2021-09-22

## 2021-09-27 NOTE — OB RN PATIENT PROFILE - PRO PRENATAL LABS ORI SOURCE ANTIBODY SCREEN
Impression: Diabetes mellitus Type 2 without mention of complication: E53.9. Plan: No Non-Proliferative Diabetic Retinopathy, no Diabetic Macular Edema and no Neovascularization of the iris, disc, or elsewhere. Discussed ocular and systemic benefits of blood sugar control. Check annually. SCM

## 2021-10-06 PROBLEM — Q33.0 CONGENITAL PULMONARY AIRWAY MALFORMATION (CPAM): Status: RESOLVED | Noted: 2018-12-17 | Resolved: 2021-10-06

## 2022-08-06 ENCOUNTER — EMERGENCY (EMERGENCY)
Facility: HOSPITAL | Age: 27
LOS: 0 days | Discharge: HOME | End: 2022-08-06
Attending: EMERGENCY MEDICINE | Admitting: EMERGENCY MEDICINE

## 2022-08-06 VITALS
HEIGHT: 62 IN | TEMPERATURE: 99 F | DIASTOLIC BLOOD PRESSURE: 71 MMHG | SYSTOLIC BLOOD PRESSURE: 119 MMHG | OXYGEN SATURATION: 97 % | RESPIRATION RATE: 18 BRPM | HEART RATE: 87 BPM | WEIGHT: 130.07 LBS

## 2022-08-06 DIAGNOSIS — M54.2 CERVICALGIA: ICD-10-CM

## 2022-08-06 DIAGNOSIS — M54.6 PAIN IN THORACIC SPINE: ICD-10-CM
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
## 2022-08-06 DIAGNOSIS — S09.90XA UNSPECIFIED INJURY OF HEAD, INITIAL ENCOUNTER: ICD-10-CM

## 2022-08-06 DIAGNOSIS — Z98.890 OTHER SPECIFIED POSTPROCEDURAL STATES: Chronic | ICD-10-CM
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
## 2022-08-06 PROCEDURE — 72125 CT NECK SPINE W/O DYE: CPT | Mod: 26,MA

## 2022-08-06 PROCEDURE — 99284 EMERGENCY DEPT VISIT MOD MDM: CPT

## 2022-08-06 RX ORDER — IBUPROFEN 200 MG
600 TABLET ORAL ONCE
Refills: 0 | Status: COMPLETED | OUTPATIENT
Start: 2022-08-06 | End: 2022-08-06

## 2022-08-06 RX ADMIN — Medication 600 MILLIGRAM(S): at 21:04

## 2022-08-06 NOTE — ED PROVIDER NOTE - NSFOLLOWUPINSTRUCTIONS_ED_ALL_ED_FT
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
## 2022-08-06 NOTE — ED PROVIDER NOTE - PATIENT PORTAL LINK FT
You can access the FollowMyHealth Patient Portal offered by [facility] by registering at the following website: [email or web address]. By joining [email or web address]’s FollowMyHealth portal, you will also be able to view your health information using other applications (apps) compatible with our system.

## 2022-08-06 NOTE — ED ADULT NURSE NOTE - NSIMPLEMENTINTERV_GEN_ALL_ED
Implemented All Universal Safety Interventions:  [place] to call system. Call bell, personal items and telephone within reach. Instruct patient to call for assistance. Room bathroom lighting operational. Non-slip footwear when patient is off stretcher. Physically safe environment: no spills, clutter or unnecessary equipment. Stretcher in lowest position, wheels locked, appropriate side rails in place.
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
## 2022-08-06 NOTE — ED ADULT NURSE REASSESSMENT NOTE - NS ED NURSE REASSESS COMMENT FT1
Patient assessed, reports some residual neck stiffness but feels better overall. Updated on plan of care and verbalized understanding.

## 2022-08-06 NOTE — ED ADULT NURSE NOTE - CAS TRG GENERAL AIRWAY, MLM
" I have rash all over my body started today around 1 pm or 2 pm " No SOB No fever Pt took Benadryl 2 tabs 2 hours ago
Patent

## 2022-08-06 NOTE — ED PROVIDER NOTE - NSFOLLOWUPCLINICS_GEN_ALL_ED_FT
[facility] Concussion Program  Concussion Program  [address], [place]   Phone: [phone]  Fax:   Follow Up Time: Urgent    [facility] ([facility])  Rehabilitation  [facility] 2nd fl[address], [address] [place] [address]  Phone: [phone]  Fax:   Follow Up Time: Urgent
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
## 2022-08-06 NOTE — ED PROVIDER NOTE - PROGRESS NOTE DETAILS
PK: Patient resting comfortably. Will order motrin Will obtain CT neck to rule out any fx. PK: Patient resting comfortably. CT reviewed showing no acute abnormalities.    Discussed all available results with Pt.  Pt understands results, plan of care, outpt follow up as discussed, and signs and symptoms for ED return.  Pt is comfortable with discharge. DC home.

## 2022-08-06 NOTE — ED PROVIDER NOTE - PHYSICAL EXAMINATION
VITAL SIGNS: I have reviewed nursing notes and confirm.  CONSTITUTIONAL: well-appearing, non-toxic, NAD  SKIN: Warm dry, normal skin turgor  HEAD: NCAT  EYES: PERRLA  ENT: Moist mucous membranes, normal pharynx with no erythema or exudates.  TM's normal b/l without bulging, no mastoid tenderness  NECK: Supple; non tender. Full ROM. No cervical LAD  CARD: RRR, no murmurs, rubs or gallops  RESP: clear to ausculation b/l.  No rales, rhonchi, or wheezing.  ABD: soft, + BS, non-tender, non-distended, no rebound or guarding. No CVA tenderness  EXT: Full ROM, no bony tenderness, no pedal edema, no calf tenderness  NEURO: normal motor. normal sensory CN II-XII intact.

## 2022-08-06 NOTE — ED PROVIDER NOTE - NSPTACCESSSVCSAPPTDETAILS_ED_ALL_ED_FT
Our Emergency Department Referral Coordinators will be reaching out ot you in the next 24-48 hours from 9:00am to 5:00pm (Monday to Friday) with a follow up appointment. Please expect a phone call from the hospital in that time frame. If you do not receive a call or if you have any questions or concerns, you can reach them at [phone] or [phone].
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
## 2022-08-15 ENCOUNTER — OUTPATIENT (OUTPATIENT)
Dept: OUTPATIENT SERVICES | Facility: HOSPITAL | Age: 27
LOS: 1 days | Discharge: HOME | End: 2022-08-15

## 2022-08-15 ENCOUNTER — APPOINTMENT (OUTPATIENT)
Dept: OBGYN | Facility: CLINIC | Age: 27
End: 2022-08-15

## 2022-08-15 ENCOUNTER — RESULT CHARGE (OUTPATIENT)
Age: 27
End: 2022-08-15

## 2022-08-15 VITALS
HEIGHT: 63 IN | DIASTOLIC BLOOD PRESSURE: 72 MMHG | BODY MASS INDEX: 23.22 KG/M2 | WEIGHT: 131.04 LBS | SYSTOLIC BLOOD PRESSURE: 114 MMHG

## 2022-08-15 DIAGNOSIS — N76.0 ACUTE VAGINITIS: ICD-10-CM

## 2022-08-15 DIAGNOSIS — N93.9 ABNORMAL UTERINE AND VAGINAL BLEEDING, UNSPECIFIED: ICD-10-CM

## 2022-08-15 DIAGNOSIS — B96.89 ACUTE VAGINITIS: ICD-10-CM

## 2022-08-15 DIAGNOSIS — Z00.00 ENCOUNTER FOR GENERAL ADULT MEDICAL EXAMINATION W/OUT ABNORMAL FINDINGS: ICD-10-CM

## 2022-08-15 DIAGNOSIS — Z98.890 OTHER SPECIFIED POSTPROCEDURAL STATES: Chronic | ICD-10-CM

## 2022-08-15 PROCEDURE — 99395 PREV VISIT EST AGE 18-39: CPT

## 2022-08-15 PROCEDURE — 99213 OFFICE O/P EST LOW 20 MIN: CPT | Mod: 25

## 2022-08-15 NOTE — HISTORY OF PRESENT ILLNESS
[FreeTextEntry1] : 26 y/o female for annual. Also c/o change in bleeding. Periods lighter-spotting only. Since starting ocp-may be ocp side effect-will perform w/o\par \par upreg neg [Patient reported PAP Smear was normal] : Patient reported PAP Smear was normal [Y] : Patient uses contraception [PapSmeardate] : 2018 [de-identified] : loestrin fe

## 2022-08-15 NOTE — DISCUSSION/SUMMARY
[FreeTextEntry1] : #1 healht maico\par '\par #2 aub possible decrease bleeding from ocp\par will perform w/o\par tvs\par [facility]\par 6 wk telehealth

## 2022-08-17 LAB
APTT BLD: 29.3 SEC
BASOPHILS # BLD AUTO: 0.04 K/UL
BASOPHILS NFR BLD AUTO: 0.6 %
DHEA-S SERPL-MCNC: 183 UG/DL
EOSINOPHIL # BLD AUTO: 0.1 K/UL
EOSINOPHIL NFR BLD AUTO: 1.4 %
FSH SERPL-MCNC: 1.4 IU/L
HCG SERPL-MCNC: <0.6 MIU/ML
HCG UR QL: NEGATIVE
HCT VFR BLD CALC: 37.4 %
HGB BLD-MCNC: 12.4 G/DL
IMM GRANULOCYTES NFR BLD AUTO: 0.3 %
INR PPP: 1.02 RATIO
LYMPHOCYTES # BLD AUTO: 2.08 K/UL
LYMPHOCYTES NFR BLD AUTO: 29.1 %
MAN DIFF?: NORMAL
MCHC RBC-ENTMCNC: 27.9 PG
MCHC RBC-ENTMCNC: 33.2 G/DL
MCV RBC AUTO: 84.2 FL
MONOCYTES # BLD AUTO: 0.32 K/UL
MONOCYTES NFR BLD AUTO: 4.5 %
NEUTROPHILS # BLD AUTO: 4.58 K/UL
NEUTROPHILS NFR BLD AUTO: 64.1 %
PLATELET # BLD AUTO: 262 K/UL
PROLACTIN SERPL-MCNC: 23.6 NG/ML
PT BLD: 11.7 SEC
RBC # BLD: 4.44 M/UL
RBC # FLD: 12.5 %
T4 FREE SERPL-MCNC: 1.2 NG/DL
TSH SERPL-ACNC: 1.39 UIU/ML
WBC # FLD AUTO: 7.14 K/UL

## 2022-08-19 LAB
A VAGINAE DNA VAG QL NAA+PROBE: NORMAL
BVAB2 DNA VAG QL NAA+PROBE: NORMAL
C KRUSEI DNA VAG QL NAA+PROBE: NEGATIVE
C TRACH RRNA SPEC QL NAA+PROBE: NEGATIVE
MEGA1 DNA VAG QL NAA+PROBE: NORMAL
N GONORRHOEA RRNA SPEC QL NAA+PROBE: NEGATIVE
T VAGINALIS RRNA SPEC QL NAA+PROBE: NEGATIVE
TESTOST FREE SERPL-MCNC: 1.5 PG/ML
TESTOST SERPL-MCNC: 22.5 NG/DL

## 2022-08-25 LAB
17OHP SERPL-MCNC: <10 NG/DL
CYTOLOGY CVX/VAG DOC THIN PREP: NORMAL

## 2022-08-30 DIAGNOSIS — N93.9 ABNORMAL UTERINE AND VAGINAL BLEEDING, UNSPECIFIED: ICD-10-CM

## 2022-08-30 DIAGNOSIS — Z00.00 ENCOUNTER FOR GENERAL ADULT MEDICAL EXAMINATION WITHOUT ABNORMAL FINDINGS: ICD-10-CM

## 2022-09-06 ENCOUNTER — APPOINTMENT (OUTPATIENT)
Dept: ANTEPARTUM | Facility: CLINIC | Age: 27
End: 2022-09-06

## 2022-09-06 ENCOUNTER — ASOB RESULT (OUTPATIENT)
Age: 27
End: 2022-09-06

## 2022-09-06 ENCOUNTER — OUTPATIENT (OUTPATIENT)
Dept: OUTPATIENT SERVICES | Facility: HOSPITAL | Age: 27
LOS: 1 days | Discharge: HOME | End: 2022-09-06

## 2022-09-06 DIAGNOSIS — Z98.890 OTHER SPECIFIED POSTPROCEDURAL STATES: Chronic | ICD-10-CM

## 2022-09-06 PROCEDURE — 76830 TRANSVAGINAL US NON-OB: CPT | Mod: 26

## 2022-09-26 ENCOUNTER — APPOINTMENT (OUTPATIENT)
Dept: OBGYN | Facility: CLINIC | Age: 27
End: 2022-09-26

## 2022-09-26 ENCOUNTER — NON-APPOINTMENT (OUTPATIENT)
Age: 27
End: 2022-09-26

## 2022-09-26 PROCEDURE — 99212 OFFICE O/P EST SF 10 MIN: CPT | Mod: 95

## 2022-10-11 ENCOUNTER — APPOINTMENT (OUTPATIENT)
Dept: NEUROSURGERY | Facility: CLINIC | Age: 27
End: 2022-10-11

## 2022-10-11 VITALS — BODY MASS INDEX: 24.48 KG/M2 | WEIGHT: 133 LBS | HEIGHT: 62 IN

## 2022-10-11 DIAGNOSIS — G89.29 LOW BACK PAIN, UNSPECIFIED: ICD-10-CM

## 2022-10-11 DIAGNOSIS — M62.838 OTHER MUSCLE SPASM: ICD-10-CM

## 2022-10-11 DIAGNOSIS — M54.50 LOW BACK PAIN, UNSPECIFIED: ICD-10-CM

## 2022-10-11 PROCEDURE — 99204 OFFICE O/P NEW MOD 45 MIN: CPT

## 2022-10-11 RX ORDER — NORETHINDRONE ACETATE AND ETHINYL ESTRADIOL AND FERROUS FUMARATE 1MG-20(21)
1-20 KIT ORAL DAILY
Qty: 28 | Refills: 12 | Status: COMPLETED | COMMUNITY
Start: 2021-07-07 | End: 2022-10-11

## 2022-10-11 RX ORDER — NORELGESTROMIN AND ETHINYL ESTRADIOL 150; 35 UG/D; UG/D
150-35 PATCH TRANSDERMAL
Qty: 1 | Refills: 13 | Status: COMPLETED | COMMUNITY
Start: 2019-12-16 | End: 2022-10-11

## 2022-10-11 RX ORDER — NORELGESTROMIN AND ETHINYL ESTRADIOL 150; 35 UG/D; UG/D
150-35 PATCH TRANSDERMAL
Qty: 3 | Refills: 12 | Status: COMPLETED | COMMUNITY
Start: 2019-06-10 | End: 2022-10-11

## 2022-10-11 NOTE — PHYSICAL EXAM
[General Appearance - Alert] : alert [General Appearance - In No Acute Distress] : in no acute distress [Oriented To Time, Place, And Person] : oriented to person, place, and time [Person] : oriented to person [Place] : oriented to place [Time] : oriented to time [Cranial Nerves Optic (II)] : visual acuity intact bilaterally,  pupils equal round and reactive to light [Cranial Nerves Oculomotor (III)] : extraocular motion intact [Cranial Nerves Trigeminal (V)] : facial sensation intact symmetrically [Cranial Nerves Facial (VII)] : face symmetrical [Cranial Nerves Vestibulocochlear (VIII)] : hearing was intact bilaterally [Cranial Nerves Glossopharyngeal (IX)] : tongue and palate midline [Cranial Nerves Hypoglossal (XII)] : there was no tongue deviation with protrusion [Cranial Nerves Accessory (XI - Cranial And Spinal)] : head turning and shoulder shrug symmetric [Motor Strength] : muscle strength was normal in all four extremities [Motor Handedness Right-Handed] : the patient is right hand dominant [Sensation Tactile Decrease] : light touch was intact [Abnormal Walk] : normal gait [2+] : Patella left 2+ [Full Visual Field] : full visual field [Mcmahon] : Mcmahon's sign was not demonstrated [Straight-Leg Raise Test - Left] : straight leg raise of the left leg was negative [Straight-Leg Raise Test - Right] : straight leg raise  of the right leg was negative

## 2022-10-11 NOTE — HISTORY OF PRESENT ILLNESS
[FreeTextEntry1] : Right-sided lower lumbar pain [de-identified] : 27-year-old female presents to the neurosurgery clinic today as a new patient for an evaluation for a ten year history of intermittent left sided lumbar back pain with radiation to posterior aspect of left thigh without continuation past the knee. \par \par She has no PMHx. \par She stated that ten years ago she lifted a heavy bag doing house chores and that is when the pain originated, sudden and sporadic onset, radiating to the posterior thigh. \par Pain is relieved with Ibuprofen and heat. She is currently participating in physical therapy and states that this is helping as well. \par \par \par MRI pelvis 8/2/2022:\par 1.  Osteitis condensans ilii. Otherwise, unremarkable bony pelvis.\par 2.  Small disc bulges at L4-L5 and L5-S1. If clinical concern warrants further evaluation may be obtained with MRI of the lumbar spine.\par \par X-ray pelvis 9/27/2022: \par Unremarkable radiographs of the pelvis.\par \par

## 2022-10-11 NOTE — END OF VISIT
[Time Spent: ___ minutes] : I have spent [unfilled] minutes of time on the encounter. [>50% of the face to face encounter time was spent on counseling and/or coordination of care for ___] : Greater than 50% of the face to face encounter time was spent on counseling and/or coordination of care for [unfilled] [FreeTextEntry3] : I have independently evaluated and examined this patient, as well as independently reviewed their imaging and agree with the ACP assessment above.  Ms. Salas does have very small disc bulges at L4-5 and L5-S1 causing mild neuroforaminal stenosis.  She still has room for significant conservative therapy and management at this time.  She does not require any additional lumbar imaging at this time and does not require neurosurgical intervention at this time and I counseled her on the importance of undergoing a low impact, aerobic exercise regimen on a stationary bicycle and to do this 3-4 times per week working her way up to 30 minutes/day.  We did also stressed the importance of core strengthening exercises to help with her overall back health and to continue her 6-week course of physical therapy.  With regards to medical management she should begin with NSAIDs 600 mg every 6 hours as needed for back pain to be taken with food.  I do believe that after undergoing this regimen for a full 4 to 6 weeks she will have much relief.  Should she need any additional pain management interventions I she can consult with her primary physician to be referred to a pain management physician at that time.  She can follow-up with us on an as-needed basis or sooner should she develop any new signs or symptoms of neurologic dysfunction.  She was very happy to hear this news that she does not require any neurosurgical intervention at this time and she was in agreement with the plan above.  All her questions were answered.  Thank you for allowing us to participate in the care of this patient.\par \par Smith Hendricks MD\par Department of Neurosurgery\par [facility]\par \par

## 2022-10-17 ENCOUNTER — APPOINTMENT (OUTPATIENT)
Dept: ANTEPARTUM | Facility: CLINIC | Age: 27
End: 2022-10-17

## 2025-02-17 NOTE — ED PROVIDER NOTE - DATE/TIME 2
Called patient back with medication instructions. All questions answered. Precautions provided. Patient verbalized understanding.  06-Aug-2022 21:42